# Patient Record
Sex: MALE | Race: BLACK OR AFRICAN AMERICAN | NOT HISPANIC OR LATINO | URBAN - METROPOLITAN AREA
[De-identification: names, ages, dates, MRNs, and addresses within clinical notes are randomized per-mention and may not be internally consistent; named-entity substitution may affect disease eponyms.]

---

## 2022-06-02 ENCOUNTER — OFFICE VISIT (OUTPATIENT)
Dept: URBAN - METROPOLITAN AREA CLINIC 17 | Facility: CLINIC | Age: 82
End: 2022-06-02
Payer: COMMERCIAL

## 2022-06-02 DIAGNOSIS — Z96.1 PRESENCE OF INTRAOCULAR LENS: ICD-10-CM

## 2022-06-02 DIAGNOSIS — H40.1111 PRIMARY OPEN-ANGLE GLAUCOMA, RIGHT EYE, MILD STAGE: ICD-10-CM

## 2022-06-02 DIAGNOSIS — E11.9 TYPE 2 DIABETES MELLITUS W/O COMPLICATION: Primary | ICD-10-CM

## 2022-06-02 DIAGNOSIS — H25.811 COMBINED FORMS OF AGE-RELATED CATARACT, RIGHT EYE: ICD-10-CM

## 2022-06-02 DIAGNOSIS — H40.1123 PRIMARY OPEN-ANGLE GLAUCOMA OF LEFT EYE, SEVERE STAGE: ICD-10-CM

## 2022-06-02 PROCEDURE — 99204 OFFICE O/P NEW MOD 45 MIN: CPT | Performed by: OPTOMETRIST

## 2022-06-02 PROCEDURE — 92133 CPTRZD OPH DX IMG PST SGM ON: CPT | Performed by: OPTOMETRIST

## 2022-06-02 ASSESSMENT — KERATOMETRY
OS: 43.38
OD: 43.00

## 2022-06-02 ASSESSMENT — INTRAOCULAR PRESSURE
OS: 13
OD: 10

## 2022-06-02 NOTE — IMPRESSION/PLAN
Impression: Primary open-angle glaucoma, right eye, mild stage: H40.1111. Plan: [facility]

## 2022-06-02 NOTE — IMPRESSION/PLAN
Impression: Primary open-angle glaucoma of left eye, severe stage: I14.5210. Plan: See above assessment.

## 2022-06-02 NOTE — IMPRESSION/PLAN
Impression: Type 2 diabetes mellitus w/o complication: K83.9. Plan: Diabetes type II: no background retinopathy, no signs of neovascularization noted. Discussed ocular and systemic benefits of blood sugar control.

## 2022-06-02 NOTE — IMPRESSION/PLAN
Impression: Combined forms of age-related cataract, right eye: H25.811. Plan: Discussed diagnosis with patient. Cataract evaluation is recommended. Will refer to Dr. Louise Santos for CAT Eval. Pt wishes to proceed.

## 2022-07-25 ENCOUNTER — OFFICE VISIT (OUTPATIENT)
Dept: URBAN - METROPOLITAN AREA CLINIC 17 | Facility: CLINIC | Age: 82
End: 2022-07-25
Payer: COMMERCIAL

## 2022-07-25 DIAGNOSIS — H40.1111 PRIMARY OPEN-ANGLE GLAUCOMA, RIGHT EYE, MILD STAGE: ICD-10-CM

## 2022-07-25 DIAGNOSIS — H25.811 COMBINED FORMS OF AGE-RELATED CATARACT, RIGHT EYE: Primary | ICD-10-CM

## 2022-07-25 PROCEDURE — 99203 OFFICE O/P NEW LOW 30 MIN: CPT | Performed by: OPHTHALMOLOGY

## 2022-07-25 ASSESSMENT — KERATOMETRY
OD: 43.25
OS: 43.63

## 2022-07-25 ASSESSMENT — INTRAOCULAR PRESSURE
OD: 12
OS: 13

## 2022-07-25 ASSESSMENT — VISUAL ACUITY
OS: CF 1FT
OD: 20/50

## 2022-07-25 NOTE — IMPRESSION/PLAN
Impression: Combined forms of age-related cataract, right eye: H25.811. Condition: established, worsening. Plan: Discussed diagnosis in detail with patient. With pt having only one good eye discussed if surgery would be beneficial at this time. Pt does not feel he is having daily activities. After discussing risk and benefits with pt decided no surgical treatment. The patient will monitor vision changes and contact us with any decrease in vision. Recommend frequent use of artificial tears especially while reading.

## 2022-07-25 NOTE — IMPRESSION/PLAN
Impression: Primary open-angle glaucoma, right eye, mild stage: H40.1111. Plan: Continue all 4 glaucoma drops and keep all upcoming appts with Dr Colonel Ron.

## 2023-03-01 ENCOUNTER — OFFICE VISIT (OUTPATIENT)
Dept: URBAN - METROPOLITAN AREA CLINIC 30 | Facility: CLINIC | Age: 83
End: 2023-03-01
Payer: COMMERCIAL

## 2023-03-01 DIAGNOSIS — H27.10 DISLOCATION OF LENS: ICD-10-CM

## 2023-03-01 DIAGNOSIS — H40.1123 PRIMARY OPEN-ANGLE GLAUCOMA OF LEFT EYE, SEVERE STAGE: ICD-10-CM

## 2023-03-01 DIAGNOSIS — H40.1112 PRIMARY OPEN-ANGLE GLAUCOMA, RIGHT EYE, MODERATE STAGE: Primary | ICD-10-CM

## 2023-03-01 DIAGNOSIS — H25.811 COMBINED FORMS OF AGE-RELATED CATARACT, RIGHT EYE: ICD-10-CM

## 2023-03-01 DIAGNOSIS — H40.1111 PRIMARY OPEN-ANGLE GLAUCOMA, RIGHT EYE, MILD STAGE: ICD-10-CM

## 2023-03-01 PROCEDURE — 92020 GONIOSCOPY: CPT | Performed by: OPHTHALMOLOGY

## 2023-03-01 PROCEDURE — 76514 ECHO EXAM OF EYE THICKNESS: CPT | Performed by: OPHTHALMOLOGY

## 2023-03-01 PROCEDURE — 92083 EXTENDED VISUAL FIELD XM: CPT | Performed by: OPHTHALMOLOGY

## 2023-03-01 PROCEDURE — 99204 OFFICE O/P NEW MOD 45 MIN: CPT | Performed by: OPHTHALMOLOGY

## 2023-03-01 ASSESSMENT — INTRAOCULAR PRESSURE
OS: 12
OD: 13

## 2023-03-01 NOTE — IMPRESSION/PLAN
Impression: Primary open-angle glaucoma, right eye, mild stage: H40.1111. Plan: Pt has Glaucoma    Gonio : multiple small synechiae OU       Pachs:  499/505    Today's IOP :  13/12       Tmax  :  21/42 Target IOP mid to low teens Pt denies Fhx of Glaucoma Right eye is the only seeing eye [facility] 24-2: inf loss OD, total loss OS (3/1/23) C/D: 0.4x0.4//no view OCT: 80/x (6/2/22) Pt denies Sulfa Allergy   // Pt denies Lung /Heart dx Plan :
1. Continue Brimonidine BID OU Timolol BID OU Cosopt BID OU Discussed details about Glaucoma and that without proper control of pressures irreversible blindness can occur. Patient understands risks. Emphasize compliance with drop and without compliance vision loss progression can occur. 
2. Doing well in terms of IOP today - glc is stable - return to optometry for continued glc care after retinal consult

## 2023-03-01 NOTE — IMPRESSION/PLAN
Impression: Dislocation of lens: H27.10. Plan: + IOL dislocation OS into posterior pole Pt to have consult with Dr. Jayshree Perrin for possible PPV with IOL removal and sutured IOL

## 2023-03-01 NOTE — IMPRESSION/PLAN
Impression: Primary open-angle glaucoma of left eye, severe stage: R64.4893. Plan: See above assessment.

## 2023-03-01 NOTE — IMPRESSION/PLAN
Impression: Combined forms of age-related cataract, right eye: H25.811. Condition: established, worsening. Plan: Cataracts account for the patient's complaints. No treatment currently recommended. The patient will monitor vision changes and contact us with any decrease in vision.

## 2023-05-01 ENCOUNTER — OFFICE VISIT (OUTPATIENT)
Dept: URBAN - METROPOLITAN AREA CLINIC 30 | Facility: CLINIC | Age: 83
End: 2023-05-01
Payer: COMMERCIAL

## 2023-05-01 DIAGNOSIS — H25.811 COMBINED FORMS OF AGE-RELATED CATARACT, RIGHT EYE: ICD-10-CM

## 2023-05-01 DIAGNOSIS — H27.10 DISLOCATION OF LENS: ICD-10-CM

## 2023-05-01 DIAGNOSIS — H43.811 VITREOUS DEGENERATION, RIGHT EYE: ICD-10-CM

## 2023-05-01 DIAGNOSIS — H40.1111 PRIMARY OPEN-ANGLE GLAUCOMA, RIGHT EYE, MILD STAGE: Primary | ICD-10-CM

## 2023-05-01 PROCEDURE — 99214 OFFICE O/P EST MOD 30 MIN: CPT | Performed by: OPHTHALMOLOGY

## 2023-05-01 PROCEDURE — 92134 CPTRZ OPH DX IMG PST SGM RTA: CPT | Performed by: OPHTHALMOLOGY

## 2023-05-01 ASSESSMENT — INTRAOCULAR PRESSURE
OS: 11
OD: 11

## 2023-05-01 NOTE — IMPRESSION/PLAN
Impression: Cataract, right eye: H25.811.  Plan: Cataracts account for the patient's complaints  OD -- defer to Dr. Rosibel Erwin

## 2023-05-01 NOTE — IMPRESSION/PLAN
Impression: Dislocation of lens: H27.10 AO60 floating in VIT  Plan: + IOL dislocation OS into posterior pole  -- GLAUCOMA is SEVERE and LIMITING -- Prior RD REPAIR LORE > DECADE ago is also limiting. .  . LONG D/w pt . . . he KNOWS this and knows RISKS r/b/a of surgery -- accepts, wants to proceed. --  Long d/w pt. Dislocated IOL implant, appropriate Retina C/s. No RD, no tear - higher risk. Extensive review options (suture/ replace /ACIOL) --determined:  
   Plan VIT / Laser R&R / likely Suture existing AO60 IOL implant - STTA OS May need new IOL sutured.  Get A-scan for aphakic OS -- Use Dextenza   2 slot

## 2023-05-01 NOTE — IMPRESSION/PLAN
Impression: Primary open-angle glaucoma Plan: Pt has Glaucoma    THIS IS THE LIMITING ISSUE -- KEEP f/u Dr. Reveles Border For primary issue -- this will limit prognosis and is a RISK OF RETINA SURGERY w VIT suture IOL (can spike IOP and lose further vision).

## 2023-05-09 ENCOUNTER — ADULT PHYSICAL (OUTPATIENT)
Dept: URBAN - METROPOLITAN AREA CLINIC 30 | Facility: CLINIC | Age: 83
End: 2023-05-09
Payer: COMMERCIAL

## 2023-05-09 DIAGNOSIS — H27.10 UNSPECIFIED DISLOCATION OF LENS: ICD-10-CM

## 2023-05-09 DIAGNOSIS — Z01.818 ENCOUNTER FOR OTHER PREPROCEDURAL EXAMINATION: Primary | ICD-10-CM

## 2023-05-09 PROCEDURE — 99203 OFFICE O/P NEW LOW 30 MIN: CPT | Performed by: REGISTERED NURSE

## 2023-05-09 ASSESSMENT — PACHYMETRY
OS: 4.0
OD: 2.92
OD: 24.15
OS: 24.27

## 2023-05-20 ENCOUNTER — POST-OPERATIVE VISIT (OUTPATIENT)
Dept: URBAN - METROPOLITAN AREA CLINIC 10 | Facility: CLINIC | Age: 83
End: 2023-05-20

## 2023-05-20 DIAGNOSIS — Z48.810 ENCOUNTER FOR SURGICAL AFTERCARE FOLLOWING SURGERY ON A SENSE ORGAN: Primary | ICD-10-CM

## 2023-05-20 PROCEDURE — 99024 POSTOP FOLLOW-UP VISIT: CPT | Performed by: STUDENT IN AN ORGANIZED HEALTH CARE EDUCATION/TRAINING PROGRAM

## 2023-05-20 ASSESSMENT — INTRAOCULAR PRESSURE: OS: 19

## 2023-05-20 NOTE — IMPRESSION/PLAN
Impression: S/P Posterior Vitrectomy (PPVIT)/ Laser R&R / likely Suture/DEXTENZA/STTA/PI OS - 1 Day. Encounter for surgical aftercare following surgery on a sense organ  Z48.810. Plan: Reviewed post-op instructions. RTC if any pain or sudden changes to vision.

## 2023-05-31 ENCOUNTER — OFFICE VISIT (OUTPATIENT)
Dept: URBAN - METROPOLITAN AREA CLINIC 24 | Facility: CLINIC | Age: 83
End: 2023-05-31
Payer: COMMERCIAL

## 2023-05-31 PROCEDURE — 92134 CPTRZ OPH DX IMG PST SGM RTA: CPT | Performed by: OPHTHALMOLOGY

## 2023-05-31 PROCEDURE — 99024 POSTOP FOLLOW-UP VISIT: CPT | Performed by: OPHTHALMOLOGY

## 2023-05-31 ASSESSMENT — INTRAOCULAR PRESSURE
OD: 17
OS: 40

## 2023-05-31 NOTE — IMPRESSION/PLAN
Impression: Dislocation of lens: H27.10 AO60 R&R for sutured CT [place] VIT/Lsr/R&R Suture IOL May '23 OS Plan: hx: [[IOL dislocation OS to postr pole - GLAUCOMA is SEVERE Estedarryl Cruz - Prior RD REPAIR LORE > DECADE ago - also limits . Lindsey KENNEDY D/w pt . Lindsey Ruby he KNOWS this - accepts RISKS r/b/a of surgery -- accepts, wanted proceed]] DONE w surgery -  VIT/Lsr/R&R Suture IOL May '23 OS. TODAY postop   NO CME postop -- IMPROVED. Finish Gtts. RETINA f/u PRN -- F/u [facility] eye care 4-6w.

## 2023-06-02 ENCOUNTER — OFFICE VISIT (OUTPATIENT)
Dept: URBAN - METROPOLITAN AREA CLINIC 24 | Facility: CLINIC | Age: 83
End: 2023-06-02
Payer: COMMERCIAL

## 2023-06-02 DIAGNOSIS — H27.10 DISLOCATION OF LENS: Primary | ICD-10-CM

## 2023-06-02 PROCEDURE — 99024 POSTOP FOLLOW-UP VISIT: CPT | Performed by: STUDENT IN AN ORGANIZED HEALTH CARE EDUCATION/TRAINING PROGRAM

## 2023-06-02 ASSESSMENT — INTRAOCULAR PRESSURE
OS: 14
OS: 16
OD: 9

## 2023-06-02 NOTE — IMPRESSION/PLAN
Impression: Dislocation of lens: H27.10 AO60 R&R for sutured CT [place] VIT/Lsr/R&R Suture IOL May '23 OS Plan: IOP improved today Cont all gtts RNFL updated OD only 78um wnl 360 RTC as scheduled with Dr Carlito Stratton

## 2023-06-14 ENCOUNTER — OFFICE VISIT (OUTPATIENT)
Dept: URBAN - METROPOLITAN AREA CLINIC 24 | Facility: CLINIC | Age: 83
End: 2023-06-14
Payer: COMMERCIAL

## 2023-06-14 DIAGNOSIS — H27.10 DISLOCATION OF LENS: ICD-10-CM

## 2023-06-14 DIAGNOSIS — H25.811 COMBINED FORMS OF AGE-RELATED CATARACT, RIGHT EYE: ICD-10-CM

## 2023-06-14 DIAGNOSIS — H40.1123 PRIMARY OPEN-ANGLE GLAUCOMA OF LEFT EYE, SEVERE STAGE: ICD-10-CM

## 2023-06-14 DIAGNOSIS — H40.1111 PRIMARY OPEN-ANGLE GLAUCOMA, RIGHT EYE, MILD STAGE: Primary | ICD-10-CM

## 2023-06-14 PROCEDURE — 99213 OFFICE O/P EST LOW 20 MIN: CPT | Performed by: OPHTHALMOLOGY

## 2023-06-14 ASSESSMENT — INTRAOCULAR PRESSURE
OS: 33
OD: 16

## 2023-06-14 NOTE — IMPRESSION/PLAN
Impression: Primary open-angle glaucoma of left eye, severe stage: E86.6436. Plan: See above assessment.

## 2023-06-14 NOTE — IMPRESSION/PLAN
Impression: Primary open-angle glaucoma, right eye, mild stage: H40.1111. Plan: Pt has Glaucoma    Gonio : multiple small synechiae OU       Pachs:  499/505    Today's IOP : 16, 33       Tmax  :  21/42 Target IOP mid to low teens Pt denies Fhx of Glaucoma Right eye is the only seeing eye [facility] 24-2: inf loss OD, total loss OS (3/1/23) C/D: 0.4x0.4// .9-.9 OCT: 80/x (6/2/22) Pt denies Sulfa Allergy   // Pt denies Lung /Heart dx Plan :
1. Cont:
Brimonidine BID OU Timolol BID OU - STOP 2/2 using Cosopt Cosopt BID OU Pred BID OS - DECREASE to QD x 1 week then STOP (( from Dr Nata Gonazlez)) Discussed details about Glaucoma and that without proper control of pressures irreversible blindness can occur. Patient understands risks. Emphasize compliance with drop and without compliance vision loss progression can occur. 2. IOL is centered. IOP is elevated today, explained that it could be due to continued steroid use. Will taper drops and return for repeat IOP check. 3. Return in 3 weeks for IOP check with optometry. -- IF IOP IS NOT IN THE TEENS, PLEASE REACH OUT TO DR Teresa Mullins.

## 2023-07-05 ENCOUNTER — OFFICE VISIT (OUTPATIENT)
Dept: URBAN - METROPOLITAN AREA CLINIC 30 | Facility: CLINIC | Age: 83
End: 2023-07-05
Payer: COMMERCIAL

## 2023-07-05 DIAGNOSIS — H40.1111 PRIMARY OPEN-ANGLE GLAUCOMA, RIGHT EYE, MILD STAGE: Primary | ICD-10-CM

## 2023-07-05 PROCEDURE — 99213 OFFICE O/P EST LOW 20 MIN: CPT | Performed by: STUDENT IN AN ORGANIZED HEALTH CARE EDUCATION/TRAINING PROGRAM

## 2023-07-05 ASSESSMENT — INTRAOCULAR PRESSURE
OS: 19
OD: 18

## 2023-07-05 NOTE — IMPRESSION/PLAN
Impression: Primary open-angle glaucoma, right eye, mild stage: H40.1111. Plan: Pt has Glaucoma    Gonio : multiple small synechiae OU       Pachs:  499/505    Today's IOP : 18/19       Tmax  :  21/42 Target IOP mid to low teens Pt denies Fhx of Glaucoma Right eye is the only seeing eye [facility] 24-2: inf loss OD, total loss OS (3/1/23) C/D: 0.4x0.4// .9-.9 OCT: 80/x (6/2/22) Pt denies Sulfa Allergy   // Pt denies Lung /Heart dx 

IOP improved OU, OD still bdln elevated but pt not using cosopt as prev directed, will increase and if not stable / in teens per prev note will reach out to Dr Phong Cruz Cont Brimonidine BID OU & tanner 1-2x/day OS Increase Cosopt QD to BID OU

RTC 6-8 wk for IOP check but pt also has appt in 1 mo with Dr Benita Erwin, if IOP not in teens please reach out and can coordinate care with Dr Phong Cruz instead of optom f/u

## 2023-08-30 ENCOUNTER — OFFICE VISIT (OUTPATIENT)
Dept: URBAN - METROPOLITAN AREA CLINIC 30 | Facility: CLINIC | Age: 83
End: 2023-08-30
Payer: COMMERCIAL

## 2023-08-30 DIAGNOSIS — H04.123 DRY EYE SYNDROME OF BILATERAL LACRIMAL GLANDS: ICD-10-CM

## 2023-08-30 DIAGNOSIS — H40.1111 PRIMARY OPEN-ANGLE GLAUCOMA, RIGHT EYE, MILD STAGE: Primary | ICD-10-CM

## 2023-08-30 PROCEDURE — 99213 OFFICE O/P EST LOW 20 MIN: CPT

## 2023-08-30 RX ORDER — POLYETHYLENE GLYCOL AND PROPYLENE GLYCOL 4; 3 MG/ML; MG/ML
SOLUTION/ DROPS OPHTHALMIC
Qty: 5 | Refills: 11 | Status: ACTIVE
Start: 2023-08-30

## 2023-08-30 ASSESSMENT — INTRAOCULAR PRESSURE
OD: 10
OS: 11